# Patient Record
Sex: FEMALE | Race: BLACK OR AFRICAN AMERICAN
[De-identification: names, ages, dates, MRNs, and addresses within clinical notes are randomized per-mention and may not be internally consistent; named-entity substitution may affect disease eponyms.]

---

## 2017-02-19 ENCOUNTER — HOSPITAL ENCOUNTER (EMERGENCY)
Dept: HOSPITAL 62 - ER | Age: 57
Discharge: HOME | End: 2017-02-19
Payer: COMMERCIAL

## 2017-02-19 VITALS — DIASTOLIC BLOOD PRESSURE: 78 MMHG | SYSTOLIC BLOOD PRESSURE: 122 MMHG

## 2017-02-19 DIAGNOSIS — R11.2: Primary | ICD-10-CM

## 2017-02-19 DIAGNOSIS — R42: ICD-10-CM

## 2017-02-19 LAB
ALBUMIN SERPL-MCNC: 4.3 G/DL (ref 3.5–5)
ALP SERPL-CCNC: 92 U/L (ref 38–126)
ALT SERPL-CCNC: 72 U/L (ref 9–52)
ANION GAP SERPL CALC-SCNC: 11 MMOL/L (ref 5–19)
APPEARANCE UR: (no result)
AST SERPL-CCNC: 56 U/L (ref 14–36)
BASOPHILS # BLD AUTO: 0 10^3/UL (ref 0–0.2)
BASOPHILS NFR BLD AUTO: 0.4 % (ref 0–2)
BILIRUB DIRECT SERPL-MCNC: 0 MG/DL (ref 0–0.3)
BILIRUB SERPL-MCNC: 1 MG/DL (ref 0.2–1.3)
BILIRUB UR QL STRIP: NEGATIVE
BUN SERPL-MCNC: 16 MG/DL (ref 7–20)
CALCIUM: 9.7 MG/DL (ref 8.4–10.2)
CHLORIDE SERPL-SCNC: 102 MMOL/L (ref 98–107)
CO2 SERPL-SCNC: 31 MMOL/L (ref 22–30)
CREAT SERPL-MCNC: 0.82 MG/DL (ref 0.52–1.25)
EOSINOPHIL # BLD AUTO: 0 10^3/UL (ref 0–0.6)
EOSINOPHIL NFR BLD AUTO: 0.7 % (ref 0–6)
ERYTHROCYTE [DISTWIDTH] IN BLOOD BY AUTOMATED COUNT: 15.5 % (ref 11.5–14)
GLUCOSE SERPL-MCNC: 120 MG/DL (ref 75–110)
GLUCOSE UR STRIP-MCNC: NEGATIVE MG/DL
HCT VFR BLD CALC: 38 % (ref 36–47)
HGB BLD-MCNC: 12.8 G/DL (ref 12–15.5)
HGB HCT DIFFERENCE: 0.4
KETONES UR STRIP-MCNC: NEGATIVE MG/DL
LIPASE SERPL-CCNC: 106.5 U/L (ref 23–300)
LYMPHOCYTES # BLD AUTO: 2 10^3/UL (ref 0.5–4.7)
LYMPHOCYTES NFR BLD AUTO: 39.5 % (ref 13–45)
MCH RBC QN AUTO: 29.5 PG (ref 27–33.4)
MCHC RBC AUTO-ENTMCNC: 33.6 G/DL (ref 32–36)
MCV RBC AUTO: 88 FL (ref 80–97)
MONOCYTES # BLD AUTO: 0.6 10^3/UL (ref 0.1–1.4)
MONOCYTES NFR BLD AUTO: 11.8 % (ref 3–13)
NEUTROPHILS # BLD AUTO: 2.4 10^3/UL (ref 1.7–8.2)
NEUTS SEG NFR BLD AUTO: 47.6 % (ref 42–78)
NITRITE UR QL STRIP: NEGATIVE
PH UR STRIP: 6 [PH] (ref 5–9)
POTASSIUM SERPL-SCNC: 3.4 MMOL/L (ref 3.6–5)
PROT SERPL-MCNC: 7.6 G/DL (ref 6.3–8.2)
PROT UR STRIP-MCNC: NEGATIVE MG/DL
RBC # BLD AUTO: 4.34 10^6/UL (ref 3.72–5.28)
SODIUM SERPL-SCNC: 144 MMOL/L (ref 137–145)
SP GR UR STRIP: 1.01
UROBILINOGEN UR-MCNC: NEGATIVE MG/DL (ref ?–2)
WBC # BLD AUTO: 5 10^3/UL (ref 4–10.5)

## 2017-02-19 PROCEDURE — 96375 TX/PRO/DX INJ NEW DRUG ADDON: CPT

## 2017-02-19 PROCEDURE — 83690 ASSAY OF LIPASE: CPT

## 2017-02-19 PROCEDURE — 81001 URINALYSIS AUTO W/SCOPE: CPT

## 2017-02-19 PROCEDURE — 80053 COMPREHEN METABOLIC PANEL: CPT

## 2017-02-19 PROCEDURE — 96374 THER/PROPH/DIAG INJ IV PUSH: CPT

## 2017-02-19 PROCEDURE — 84703 CHORIONIC GONADOTROPIN ASSAY: CPT

## 2017-02-19 PROCEDURE — 96361 HYDRATE IV INFUSION ADD-ON: CPT

## 2017-02-19 PROCEDURE — 36415 COLL VENOUS BLD VENIPUNCTURE: CPT

## 2017-02-19 PROCEDURE — 85025 COMPLETE CBC W/AUTO DIFF WBC: CPT

## 2017-02-19 PROCEDURE — 82962 GLUCOSE BLOOD TEST: CPT

## 2017-02-19 PROCEDURE — 99284 EMERGENCY DEPT VISIT MOD MDM: CPT

## 2017-02-19 NOTE — ER DOCUMENT REPORT
ED General





- General


Chief Complaint: Vomiting


Stated Complaint: DIZZY/VOMITING


Mode of Arrival: Ambulatory


TRAVEL OUTSIDE OF THE [place] IN LAST 30 DAYS: No





- HPI


Patient complains to provider of: nausea vomiting dizziness


Notes: 


Patient coming in for symptoms ongoing for the last 4 days nausea vomiting 

dizziness.  Patient states no sick contacts no recent antibiotics.  Patient 

states no recent travel out of state or country states no flu vaccine this 

year.  Denies fevers chills chest pain abdominal pain





- Related Data


Allergies/Adverse Reactions: 


 





No Known Allergies Allergy (Verified 02/19/17 11:05)


 











Past Medical History





- General


Information source: Patient





- Social History


Smoking Status: Unknown if Ever Smoked


Chew tobacco use (# tins/day): No


Frequency of alcohol use: None


Drug Abuse: None


Family History: Reviewed & Not Pertinent


Patient has suicidal ideation: No


Patient has homicidal ideation: No





- Past Medical History


Cardiac Medical History: Reports: Hx Hypertension


   Denies: Hx Coronary Artery Disease, Hx Heart Attack


Pulmonary Medical History: Reports: Hx Asthma


   Denies: Hx Bronchitis, Hx COPD, Hx Pneumonia


Neurological Medical History: Denies: Hx Cerebrovascular Accident, Hx Seizures


Endocrine Medical History: Reports: Hx Diabetes Mellitus Type 2


Renal/ Medical History: Denies: Hx Peritoneal Dialysis


Musculoskeltal Medical History: Reports Hx Arthritis - shoulders, back, knee  


Past Surgical History: Reports: Hx Orthopedic Surgery - Knee.  Denies: Hx 

Appendectomy





- Immunizations


Hx Diphtheria, Pertussis, Tetanus Vaccination: No





Review of Systems





- Review of Systems


Constitutional: No symptoms reported


EENT: No symptoms reported


Cardiovascular: No symptoms reported


Respiratory: No symptoms reported


Gastrointestinal: Abdominal pain, Nausea, Vomiting


Genitourinary: No symptoms reported


Female Genitourinary: No symptoms reported


Musculoskeletal: No symptoms reported


Skin: No symptoms reported


Hematologic/Lymphatic: No symptoms reported


Neurological/Psychological: No symptoms reported


-: Yes All other systems reviewed and negative





Physical Exam





- Vital signs


Vitals: 


 











Temp Pulse Resp BP Pulse Ox


 


 97.9 F   109 H  16   153/97 H  96 


 


 02/19/17 11:06  02/19/17 11:06  02/19/17 11:06  02/19/17 11:06  02/19/17 11:06











Interpretation: Normal





- General


General appearance: Appears well, Alert





- HEENT


Head: Normocephalic, Atraumatic


Eyes: Normal


Pupils: PERRL





- Respiratory


Respiratory status: No respiratory distress


Chest status: Nontender


Breath sounds: Normal


Chest palpation: Normal





- Cardiovascular


Rhythm: Regular


Heart sounds: Normal auscultation


Murmur: No





- Abdominal


Inspection: Normal


Distension: No distension


Bowel sounds: Normal


Tenderness: Nontender


Organomegaly: No organomegaly





- Back


Back: Normal, Nontender





- Extremities


General upper extremity: Normal inspection, Nontender, Normal color, Normal ROM

, Normal temperature


General lower extremity: Normal inspection, Nontender, Normal color, Normal ROM

, Normal temperature, Normal weight bearing.  No: Samira's sign





- Neurological


Neuro grossly intact: Yes


Cognition: Normal


Orientation: AAOx4


[place] Coma Scale Eye Opening: Spontaneous


Manjinder Coma Scale Verbal: Oriented


Manjinder Coma Scale Motor: Obeys Commands


[place] Coma Scale Total: 15


Speech: Normal


Motor strength normal: LUE, RUE, LLE, RLE


Sensory: Normal





- Psychological


Associated symptoms: Normal affect, Normal mood





- Skin


Skin Temperature: Warm


Skin Moisture: Dry


Skin Color: Normal





Course





- Re-evaluation


Re-evalutation: 





02/19/17 15:17


The patient presents with n/v/d without signs of peritonitis or other life-

threatening or serious etiology. The patient appears stable for discharge and 

has been instructed to return immediately if the symptoms worsen in any way, or 

in 8-12hr if not improved for re-evaluation. The patient has been instructed to 

return if the symptoms worsen or change in any way.. 





- Vital Signs


Vital signs: 


 











Temp Pulse Resp BP Pulse Ox


 


 98.3 F   91   20   122/78   93 


 


 02/19/17 14:05  02/19/17 14:05  02/19/17 14:05  02/19/17 14:05  02/19/17 14:05














- Laboratory


Result Diagrams: 


 02/19/17 11:25





 02/19/17 11:25


Laboratory results interpreted by me: 


 











  02/19/17 02/19/17 02/19/17





  11:20 11:25 11:25


 


RDW   15.5 H 


 


Potassium    3.4 L


 


Carbon Dioxide    31 H


 


Glucose    120 H


 


AST    56 H


 


ALT    72 H


 


Urine Blood  SMALL H  














Discharge





- Discharge


Clinical Impression: 


 Feeling unwell





Nausea & vomiting


Qualifiers:


 Vomiting type: unspecified Vomiting Intractability: unspecified Qualified Code(

s): R11.2 - Nausea with vomiting, unspecified





Condition: Good


Disposition: HOME, SELF-CARE


Instructions:  Gastroenteritis (adult) ([facility])


Additional Instructions: 


Take medication as prescribed.  Return to ER symptoms worsen.


Prescriptions: 


Ondansetron [Zofran Odt 4 mg Tablet] 1 - 2 tab PO Q4H PRN #30 tab.rapdis


 PRN Reason: For Nausea/Vomiting


Promethazine HCl [Phenergan 25 mg Tablet] 1 - 2 tab PO Q6H PRN #30 tablet


 PRN Reason: 


Forms:  Return to Work

## 2017-02-19 NOTE — ER DOCUMENT REPORT
ED Medical Screen (RME)





- General


Stated Complaint: DIZZY/VOMITING


Mode of Arrival: Ambulatory


Information source: Patient


Notes: 


Patient complains of dizziness.  Patient reports symptoms are past 3 days.  

Patient reports nausea vomiting, and diarrhea.  Patient denies any abdominal 

pain.





hx: Hypertension, diabetes





I have greeted and performed a rapid initial assessment of this patient.  A 

comprehensive ED assessment and evaluation of the patient, analysis of test 

results and completion of the medical decision making process will be conducted 

by additional ED providers.


TRAVEL OUTSIDE OF THE [place] IN LAST 30 DAYS: No





- Related Data


Allergies/Adverse Reactions: 


 





No Known Allergies Allergy (Verified 02/19/17 11:05)


 











Past Medical History





- Past Medical History


Cardiac Medical History: Reports: Hx Hypertension


   Denies: Hx Coronary Artery Disease, Hx Heart Attack


Pulmonary Medical History: Reports: Hx Asthma


   Denies: Hx Bronchitis, Hx COPD, Hx Pneumonia


Neurological Medical History: Denies: Hx Cerebrovascular Accident, Hx Seizures


Endocrine Medical History: Reports: Hx Diabetes Mellitus Type 2


Musculoskeltal Medical History: Reports Hx Arthritis - shoulders, back, knee  


Past Surgical History: Reports: Hx Orthopedic Surgery





- Immunizations


Hx Diphtheria, Pertussis, Tetanus Vaccination: No





Physical Exam





- General


General appearance: Appears well, Alert


In distress: None

## 2018-08-10 ENCOUNTER — HOSPITAL ENCOUNTER (OUTPATIENT)
Dept: HOSPITAL 62 - OD | Age: 58
End: 2018-08-10
Attending: ORTHOPAEDIC SURGERY
Payer: COMMERCIAL

## 2018-08-10 DIAGNOSIS — I10: ICD-10-CM

## 2018-08-10 DIAGNOSIS — Z11.2: Primary | ICD-10-CM

## 2018-08-10 DIAGNOSIS — E11.8: ICD-10-CM

## 2018-08-10 LAB
ADD MANUAL DIFF: NO
ALBUMIN SERPL-MCNC: 4.2 G/DL (ref 3.5–5)
ALP SERPL-CCNC: 100 U/L (ref 38–126)
ALT SERPL-CCNC: 43 U/L (ref 9–52)
ANION GAP SERPL CALC-SCNC: 14 MMOL/L (ref 5–19)
AST SERPL-CCNC: 47 U/L (ref 14–36)
BASOPHILS # BLD AUTO: 0 10^3/UL (ref 0–0.2)
BASOPHILS NFR BLD AUTO: 0.4 % (ref 0–2)
BILIRUB DIRECT SERPL-MCNC: 0.3 MG/DL (ref 0–0.4)
BILIRUB SERPL-MCNC: 0.6 MG/DL (ref 0.2–1.3)
BUN SERPL-MCNC: 17 MG/DL (ref 7–20)
CALCIUM: 9.3 MG/DL (ref 8.4–10.2)
CHLORIDE SERPL-SCNC: 102 MMOL/L (ref 98–107)
CO2 SERPL-SCNC: 27 MMOL/L (ref 22–30)
EOSINOPHIL # BLD AUTO: 0.1 10^3/UL (ref 0–0.6)
EOSINOPHIL NFR BLD AUTO: 1.1 % (ref 0–6)
ERYTHROCYTE [DISTWIDTH] IN BLOOD BY AUTOMATED COUNT: 13.6 % (ref 11.5–14)
GLUCOSE SERPL-MCNC: 87 MG/DL (ref 75–110)
HCT VFR BLD CALC: 37 % (ref 36–47)
HGB BLD-MCNC: 12.6 G/DL (ref 12–15.5)
LYMPHOCYTES # BLD AUTO: 2.4 10^3/UL (ref 0.5–4.7)
LYMPHOCYTES NFR BLD AUTO: 48.9 % (ref 13–45)
MCH RBC QN AUTO: 30.4 PG (ref 27–33.4)
MCHC RBC AUTO-ENTMCNC: 34.2 G/DL (ref 32–36)
MCV RBC AUTO: 89 FL (ref 80–97)
MONOCYTES # BLD AUTO: 0.5 10^3/UL (ref 0.1–1.4)
MONOCYTES NFR BLD AUTO: 11.1 % (ref 3–13)
NEUTROPHILS # BLD AUTO: 1.9 10^3/UL (ref 1.7–8.2)
NEUTS SEG NFR BLD AUTO: 38.5 % (ref 42–78)
PLATELET # BLD: 296 10^3/UL (ref 150–450)
POTASSIUM SERPL-SCNC: 2.8 MMOL/L (ref 3.6–5)
PROT SERPL-MCNC: 7.7 G/DL (ref 6.3–8.2)
RBC # BLD AUTO: 4.16 10^6/UL (ref 3.72–5.28)
SODIUM SERPL-SCNC: 143.2 MMOL/L (ref 137–145)
TOTAL CELLS COUNTED % (AUTO): 100 %
WBC # BLD AUTO: 4.9 10^3/UL (ref 4–10.5)

## 2018-08-10 PROCEDURE — 36415 COLL VENOUS BLD VENIPUNCTURE: CPT

## 2018-08-10 PROCEDURE — 87070 CULTURE OTHR SPECIMN AEROBIC: CPT

## 2018-08-10 PROCEDURE — 80053 COMPREHEN METABOLIC PANEL: CPT

## 2018-08-10 PROCEDURE — 83036 HEMOGLOBIN GLYCOSYLATED A1C: CPT

## 2018-08-10 PROCEDURE — 85025 COMPLETE CBC W/AUTO DIFF WBC: CPT

## 2019-08-22 ENCOUNTER — HOSPITAL ENCOUNTER (EMERGENCY)
Dept: HOSPITAL 62 - ER | Age: 59
Discharge: HOME | End: 2019-08-22
Payer: COMMERCIAL

## 2019-08-22 VITALS — SYSTOLIC BLOOD PRESSURE: 134 MMHG | DIASTOLIC BLOOD PRESSURE: 83 MMHG

## 2019-08-22 DIAGNOSIS — R00.1: ICD-10-CM

## 2019-08-22 DIAGNOSIS — M79.602: ICD-10-CM

## 2019-08-22 DIAGNOSIS — Z87.891: ICD-10-CM

## 2019-08-22 DIAGNOSIS — E11.9: ICD-10-CM

## 2019-08-22 DIAGNOSIS — M54.12: ICD-10-CM

## 2019-08-22 DIAGNOSIS — I10: ICD-10-CM

## 2019-08-22 DIAGNOSIS — R20.2: ICD-10-CM

## 2019-08-22 DIAGNOSIS — H81.10: Primary | ICD-10-CM

## 2019-08-22 LAB
ADD MANUAL DIFF: NO
ALBUMIN SERPL-MCNC: 4 G/DL (ref 3.5–5)
ALP SERPL-CCNC: 97 U/L (ref 38–126)
ANION GAP SERPL CALC-SCNC: 7 MMOL/L (ref 5–19)
AST SERPL-CCNC: 31 U/L (ref 14–36)
BASOPHILS # BLD AUTO: 0 10^3/UL (ref 0–0.2)
BASOPHILS NFR BLD AUTO: 0.3 % (ref 0–2)
BILIRUB DIRECT SERPL-MCNC: 0.1 MG/DL (ref 0–0.4)
BILIRUB SERPL-MCNC: 0.4 MG/DL (ref 0.2–1.3)
BUN SERPL-MCNC: 22 MG/DL (ref 7–20)
CALCIUM: 9.2 MG/DL (ref 8.4–10.2)
CHLORIDE SERPL-SCNC: 105 MMOL/L (ref 98–107)
CK MB SERPL-MCNC: 1.6 NG/ML (ref ?–4.55)
CK SERPL-CCNC: 318 U/L (ref 30–135)
CO2 SERPL-SCNC: 29 MMOL/L (ref 22–30)
EOSINOPHIL # BLD AUTO: 0 10^3/UL (ref 0–0.6)
EOSINOPHIL NFR BLD AUTO: 0.9 % (ref 0–6)
ERYTHROCYTE [DISTWIDTH] IN BLOOD BY AUTOMATED COUNT: 14 % (ref 11.5–14)
GLUCOSE SERPL-MCNC: 91 MG/DL (ref 75–110)
HCT VFR BLD CALC: 37.3 % (ref 36–47)
HGB BLD-MCNC: 12.5 G/DL (ref 12–15.5)
LYMPHOCYTES # BLD AUTO: 2 10^3/UL (ref 0.5–4.7)
LYMPHOCYTES NFR BLD AUTO: 48.4 % (ref 13–45)
MCH RBC QN AUTO: 30 PG (ref 27–33.4)
MCHC RBC AUTO-ENTMCNC: 33.5 G/DL (ref 32–36)
MCV RBC AUTO: 90 FL (ref 80–97)
MONOCYTES # BLD AUTO: 0.4 10^3/UL (ref 0.1–1.4)
MONOCYTES NFR BLD AUTO: 10.1 % (ref 3–13)
NEUTROPHILS # BLD AUTO: 1.7 10^3/UL (ref 1.7–8.2)
NEUTS SEG NFR BLD AUTO: 40.3 % (ref 42–78)
PLATELET # BLD: 252 10^3/UL (ref 150–450)
POTASSIUM SERPL-SCNC: 3.9 MMOL/L (ref 3.6–5)
PROT SERPL-MCNC: 7.3 G/DL (ref 6.3–8.2)
RBC # BLD AUTO: 4.16 10^6/UL (ref 3.72–5.28)
TOTAL CELLS COUNTED % (AUTO): 100 %
TROPONIN I SERPL-MCNC: < 0.012 NG/ML
WBC # BLD AUTO: 4.2 10^3/UL (ref 4–10.5)

## 2019-08-22 PROCEDURE — 93010 ELECTROCARDIOGRAM REPORT: CPT

## 2019-08-22 PROCEDURE — 93005 ELECTROCARDIOGRAM TRACING: CPT

## 2019-08-22 PROCEDURE — 80053 COMPREHEN METABOLIC PANEL: CPT

## 2019-08-22 PROCEDURE — 84484 ASSAY OF TROPONIN QUANT: CPT

## 2019-08-22 PROCEDURE — 82553 CREATINE MB FRACTION: CPT

## 2019-08-22 PROCEDURE — 82550 ASSAY OF CK (CPK): CPT

## 2019-08-22 PROCEDURE — 85025 COMPLETE CBC W/AUTO DIFF WBC: CPT

## 2019-08-22 PROCEDURE — 36415 COLL VENOUS BLD VENIPUNCTURE: CPT

## 2019-08-22 NOTE — ER DOCUMENT REPORT
ED General





- General


Chief Complaint: Dizziness


Stated Complaint: DIZZINESS


Time Seen by Provider: 08/22/19 14:42


Primary Care Provider: 


COSMO JOHNSON MD [ASSOCIATE] - Follow up as needed


Mode of Arrival: Ambulatory


TRAVEL OUTSIDE OF THE [place] IN LAST 30 DAYS: No





- HPI


Notes: 





Patient presents emergency department for evaluation of dizziness as well as l

eft arm tingling and pain.  She states her dizziness started this morning.  She 

complains of vertiginous type sensation.  She has had this in the past.  She 

took 12-1/2 mg of meclizine without any significant improvement so she presents 

here.  She states that her vertigo has been improved since then.  She denies any

 visual changes.  No difficulty speaking or swallowing.  Moving her arms and 

legs without difficulty.  Over the last month she has had an aching, burning, 

and "jumping" pain in her left arm.  It seems to go through her entire arm, 

upper shoulder, and into her neck.  She states is helped by propping her arm on 

a pillow at night.  Nothing seems to make it worse.





- Related Data


Allergies/Adverse Reactions: 


                                        





No Known Allergies Allergy (Verified 08/22/19 14:14)


   











Past Medical History





- General


Information source: Patient





- Social History


Smoking Status: Former Smoker


Chew tobacco use (# tins/day): No


Frequency of alcohol use: Occasional


Drug Abuse: None


Family History: Reviewed & Not Pertinent, Malignancy


Patient has suicidal ideation: No


Patient has homicidal ideation: No





- Past Medical History


Cardiac Medical History: Reports: Hx Hypercholesterolemia - Dominant cancer, Hx 

Hypertension


   Denies: Hx Coronary Artery Disease, Hx Heart Attack


Pulmonary Medical History: Reports: Hx Asthma


   Denies: Hx Bronchitis, Hx COPD, Hx Pneumonia


Neurological Medical History: Denies: Hx Cerebrovascular Accident, Hx Seizures


Endocrine Medical History: Reports: Hx Diabetes Mellitus Type 2


Renal/ Medical History: Denies: Hx Peritoneal Dialysis


Musculoskeletal Medical History: Reports Hx Arthritis - shoulders, back, knee  


Past Surgical History: Reports: Hx Orthopedic Surgery - Knee.  Denies: Hx A

ppendectomy





- Immunizations


Hx Diphtheria, Pertussis, Tetanus Vaccination: No





Review of Systems





- Review of Systems


Constitutional: No symptoms reported


EENT: No symptoms reported


Cardiovascular: No symptoms reported


Respiratory: No symptoms reported


Gastrointestinal: No symptoms reported


Genitourinary: No symptoms reported


Musculoskeletal: See HPI


Skin: No symptoms reported


Neurological/Psychological: See HPI





Physical Exam





- Vital signs


Vitals: 





                                        











Temp Pulse Resp BP Pulse Ox


 


 98.2 F   58 L  15   128/78 H  94 


 


 08/22/19 14:19  08/22/19 14:19  08/22/19 14:19  08/22/19 14:19  08/22/19 14:19














- Notes


Notes: 





Vital signs reviewed, please refer to chart. Head is normocephalic, atraumatic. 

 Pupils equal round, reactive to light.  TMs are pearly gray with good light 

reflex.  Neck is supple without meningismus.  Heart is regular rate and rhythm. 

 Lungs are clear to auscultation bilaterally.  Abdomen is soft, nontender, 

normoactive bowel sounds throughout.  Extremities without cyanosis, clubbing. 

Posterior calves are nontender.  Peripheral pulses are equal.  Skin is warm and 

dry.  Patient is awake, alert, oriented x3.  Cranial nerves II - XII are grossly

 intact without focal neurological deficits.  Strength is plus 5 out of 5 

bilateral upper and lower extremities.  Sensation is intact.  Reflexes 

symmetrical.  Intact finger-nose-finger, rapid alternating movements, h

eel-to-shin.  Examination of the left upper extremity yields no obvious 

deformity.  She has significant tenderness to palpation over the paraspinal 

musculature throughout the cervical spine on the left, over the trapezius, 

particularly if he has insertion of the first rib.  She has biceps tenderness to

 palpation.  Biceps and brachioradialis reflexes are mildly diminished.  

Sensation is intact.  Strength is plus 5 out of 5 throughout the left upper 

extremity.





Course





- Re-evaluation


Re-evalutation: 





08/22/19 17:12


Patient presents emergency department for evaluation.  Her dizziness symptoms 

are most consistent with benign positional vertigo.  She has no tinnitus or 

hearing loss.  Her neurological exam is normal.  I explained to the patient that

 she could actually take 25 mg of meclizine as needed for this dizziness, and 

she voiced understanding.  In regards to her left upper extremity discomfort, I 

do believe this is secondary to cervical radiculopathy.  I will send her home 

with some muscle relaxers.  I explained to the patient in great detail that she 

has multiple risk factors for coronary artery disease.  She should discuss 

stress testing with her primary care provider.  She voiced understanding to 

this. Her work-up here was negative from a cardiac standpoint, but I was unable 

to rule out the presence of coronary artery disease.  She voiced understanding 

to this as well and was discharged.


08/22/19 17:14








- Vital Signs


Vital signs: 





                                        











Temp Pulse Resp BP Pulse Ox


 


 98.2 F   58 L  13   133/84 H  98 


 


 08/22/19 14:19  08/22/19 14:19  08/22/19 16:01  08/22/19 16:01  08/22/19 16:01














- Laboratory


Result Diagrams: 


                                 08/22/19 14:50





                                 08/22/19 14:50


Laboratory results interpreted by me: 





                                        











  08/22/19 08/22/19





  14:50 14:50


 


Lymph % (Auto)  48.4 H 


 


Seg Neutrophils %  40.3 L 


 


BUN   22 H


 


Est GFR (MDRD) Non-Af   56 L


 


Creatine Kinase   318 H














- EKG Interpretation by Me


Additional EKG results interpreted by me: 





08/22/19 17:14


Sinus bradycardia with a rate of 59 bpm.  Normal axis intervals.  Nonspecific ST

 changes, but no acute changes concerning for ischemia or infarction.  No 

significant change in compared to prior study of September 20, 2016





Discharge





- Discharge


Clinical Impression: 


 Cervical radiculopathy





Benign positional vertigo


Qualifiers:


 Laterality: unspecified laterality Qualified Code(s): H81.10 - Benign 

paroxysmal vertigo, unspecified ear





Condition: Stable


Disposition: HOME, SELF-CARE


Instructions:  Vertigo ([facility]), Dizziness ([facility]), Radiculopathy ([facility])


Additional Instructions: 


Take meclizine as needed for vertigo.  Please watch for drowsiness with this 

medication.  You can also take Robaxin as needed for neck and left arm pain.  

Follow-up with your primary care provider for further evaluation.  If you 

develop worsening or new concerning symptoms of any sort, return immediately to 

the emergency department for reevaluation.


Referrals: 


COSMO JOHNSON MD [ASSOCIATE] - Follow up as needed

## 2019-08-22 NOTE — ER DOCUMENT REPORT
ED Medical Screen (RME)





- General


Chief Complaint: Dizziness


Stated Complaint: DIZZINESS


Time Seen by Provider: 08/22/19 14:42


Primary Care Provider: 


COSMO JOHNSON MD [Primary Care Provider] - Follow up as needed


Mode of Arrival: Ambulatory


Information source: Patient


Notes: 





59-year-old female presented to ED for complaint of dizziness when she woke up 

this morning.  She states she has had a history of dizziness in the past and had

medications for but it did not help today.  She states she has had some tingling

and pulsating in her left arm down to her fingers for about the last month but 

she does not know what this is from.  She states when she is busy and up and 

doing stuff she can black it out but when she is not busy than she feels it.  

She does have a history of carpal tunnel surgery diabetes type 2 blood pressure 

and low potassium.  She is a former smoker she drinks about monthly and does not

use any street drugs.  She lives with her .  Patient is alert oriented 

respirations regular and unlabored speaking in full sentences walks with a even 

steady gait.














I have greeted and performed a rapid initial assessment of this patient.  A 

comprehensive ED assessment and evaluation of the patient, analysis of test 

results and completion of medical decision making process will be conducted by 

an additional ED providers.














Dictation of this chart was performed using voice recognition software; 

therefore, there may be some unintended grammatical errors.


TRAVEL OUTSIDE OF THE [place] IN LAST 30 DAYS: No





- Related Data


Allergies/Adverse Reactions: 


                                        





No Known Allergies Allergy (Verified 08/22/19 14:14)


   











Past Medical History





- Past Medical History


Cardiac Medical History: Reports: Hx Hypertension


   Denies: Hx Coronary Artery Disease, Hx Heart Attack


Pulmonary Medical History: Reports: Hx Asthma


   Denies: Hx Bronchitis, Hx COPD, Hx Pneumonia


Neurological Medical History: Denies: Hx Cerebrovascular Accident, Hx Seizures


Endocrine Medical History: Reports: Hx Diabetes Mellitus Type 2


Renal/ Medical History: Denies: Hx Peritoneal Dialysis


Musculoskeltal Medical History: Reports Hx Arthritis - shoulders, back, knee  


Past Surgical History: Reports: Hx Orthopedic Surgery - Knee.  Denies: Hx 

Appendectomy





- Immunizations


Hx Diphtheria, Pertussis, Tetanus Vaccination: No





Physical Exam





- Vital signs


Vitals: 





                                        











Temp Pulse Resp BP Pulse Ox


 


 98.2 F   58 L  15   128/78 H  94 


 


 08/22/19 14:19  08/22/19 14:19  08/22/19 14:19  08/22/19 14:19  08/22/19 14:19














Course





- Vital Signs


Vital signs: 





                                        











Temp Pulse Resp BP Pulse Ox


 


 98.2 F   58 L  15   128/78 H  94 


 


 08/22/19 14:19  08/22/19 14:19  08/22/19 14:19  08/22/19 14:19  08/22/19 14:19














Doctor's Discharge





- Discharge


Referrals: 


COSMO JOHNSON MD [Primary Care Provider] - Follow up as needed

## 2020-12-24 ENCOUNTER — HOSPITAL ENCOUNTER (EMERGENCY)
Dept: HOSPITAL 62 - ER | Age: 60
Discharge: HOME | End: 2020-12-24
Payer: COMMERCIAL

## 2020-12-24 VITALS — DIASTOLIC BLOOD PRESSURE: 62 MMHG | SYSTOLIC BLOOD PRESSURE: 118 MMHG

## 2020-12-24 DIAGNOSIS — Z79.899: ICD-10-CM

## 2020-12-24 DIAGNOSIS — R51.9: ICD-10-CM

## 2020-12-24 DIAGNOSIS — R50.9: ICD-10-CM

## 2020-12-24 DIAGNOSIS — R53.1: ICD-10-CM

## 2020-12-24 DIAGNOSIS — J45.909: ICD-10-CM

## 2020-12-24 DIAGNOSIS — N30.00: Primary | ICD-10-CM

## 2020-12-24 DIAGNOSIS — E11.9: ICD-10-CM

## 2020-12-24 DIAGNOSIS — M54.5: ICD-10-CM

## 2020-12-24 DIAGNOSIS — M54.9: ICD-10-CM

## 2020-12-24 DIAGNOSIS — I10: ICD-10-CM

## 2020-12-24 DIAGNOSIS — R10.9: ICD-10-CM

## 2020-12-24 LAB
ADD MANUAL DIFF: NO
ALBUMIN SERPL-MCNC: 3.3 G/DL (ref 3.5–5)
ALP SERPL-CCNC: 237 U/L (ref 38–126)
ANION GAP SERPL CALC-SCNC: 12 MMOL/L (ref 5–19)
APPEARANCE UR: (no result)
APTT PPP: (no result) S
AST SERPL-CCNC: 57 U/L (ref 14–36)
BASOPHILS # BLD AUTO: 0.1 10^3/UL (ref 0–0.2)
BASOPHILS NFR BLD AUTO: 0.5 % (ref 0–2)
BILIRUB DIRECT SERPL-MCNC: 2.4 MG/DL (ref 0–0.4)
BILIRUB SERPL-MCNC: 3.3 MG/DL (ref 0.2–1.3)
BILIRUB UR QL STRIP: NEGATIVE
BUN SERPL-MCNC: 30 MG/DL (ref 7–20)
CALCIUM: 8.4 MG/DL (ref 8.4–10.2)
CHLORIDE SERPL-SCNC: 95 MMOL/L (ref 98–107)
CK SERPL-CCNC: 56 U/L (ref 30–135)
CO2 SERPL-SCNC: 28 MMOL/L (ref 22–30)
EOSINOPHIL # BLD AUTO: 0 10^3/UL (ref 0–0.6)
EOSINOPHIL NFR BLD AUTO: 0.2 % (ref 0–6)
ERYTHROCYTE [DISTWIDTH] IN BLOOD BY AUTOMATED COUNT: 13.8 % (ref 11.5–14)
GLUCOSE SERPL-MCNC: 106 MG/DL (ref 75–110)
GLUCOSE UR STRIP-MCNC: NEGATIVE MG/DL
HCT VFR BLD CALC: 33.1 % (ref 36–47)
HGB BLD-MCNC: 11.5 G/DL (ref 12–15.5)
KETONES UR STRIP-MCNC: NEGATIVE MG/DL
LYMPHOCYTES # BLD AUTO: 1.1 10^3/UL (ref 0.5–4.7)
LYMPHOCYTES NFR BLD AUTO: 9.3 % (ref 13–45)
MCH RBC QN AUTO: 29.6 PG (ref 27–33.4)
MCHC RBC AUTO-ENTMCNC: 34.7 G/DL (ref 32–36)
MCV RBC AUTO: 85 FL (ref 80–97)
MONOCYTES # BLD AUTO: 1.2 10^3/UL (ref 0.1–1.4)
MONOCYTES NFR BLD AUTO: 10.5 % (ref 3–13)
NEUTROPHILS # BLD AUTO: 9.3 10^3/UL (ref 1.7–8.2)
NEUTS SEG NFR BLD AUTO: 79.5 % (ref 42–78)
NITRITE UR QL STRIP: POSITIVE
PH UR STRIP: 6 [PH] (ref 5–9)
PLATELET # BLD: 269 10^3/UL (ref 150–450)
POTASSIUM SERPL-SCNC: 3.4 MMOL/L (ref 3.6–5)
PROT SERPL-MCNC: 7.3 G/DL (ref 6.3–8.2)
PROT UR STRIP-MCNC: 100 MG/DL
RBC # BLD AUTO: 3.88 10^6/UL (ref 3.72–5.28)
SP GR UR STRIP: 1.01
TOTAL CELLS COUNTED % (AUTO): 100 %
UROBILINOGEN UR-MCNC: 4 MG/DL (ref ?–2)
WBC # BLD AUTO: 11.7 10^3/UL (ref 4–10.5)

## 2020-12-24 PROCEDURE — 76770 US EXAM ABDO BACK WALL COMP: CPT

## 2020-12-24 PROCEDURE — 36415 COLL VENOUS BLD VENIPUNCTURE: CPT

## 2020-12-24 PROCEDURE — 82550 ASSAY OF CK (CPK): CPT

## 2020-12-24 PROCEDURE — 87086 URINE CULTURE/COLONY COUNT: CPT

## 2020-12-24 PROCEDURE — 80053 COMPREHEN METABOLIC PANEL: CPT

## 2020-12-24 PROCEDURE — 85025 COMPLETE CBC W/AUTO DIFF WBC: CPT

## 2020-12-24 PROCEDURE — 87088 URINE BACTERIA CULTURE: CPT

## 2020-12-24 PROCEDURE — 87186 SC STD MICRODIL/AGAR DIL: CPT

## 2020-12-24 PROCEDURE — 96372 THER/PROPH/DIAG INJ SC/IM: CPT

## 2020-12-24 PROCEDURE — 99285 EMERGENCY DEPT VISIT HI MDM: CPT

## 2020-12-24 PROCEDURE — 81001 URINALYSIS AUTO W/SCOPE: CPT

## 2020-12-24 NOTE — ER DOCUMENT REPORT
ED Neck/Back Problem





- General


Chief Complaint: Back Pain


Stated Complaint: BACK PAIN


Time Seen by Provider: 12/24/20 14:08


Mode of Arrival: Wheelchair


Information source: Patient


Notes: 





ED Medical Screen ([facility] Back)





- General


Chief Complaint: Back Pain


Stated Complaint: BACK PAIN


Time Seen by Provider: 12/24/20 14:08


Mode of Arrival: Wheelchair


Information source: Patient


Notes: 





60-year-old female presented to ED for flank pain since ago Monday.  She did 

call the doctor by the telephone on Friday and was started on fluconazole.  She 

took all of this and did not get any relief.  She states that the doctor 

recommended she come in to be tested for Covid she did get tested for Covid and 

flu and they were negative yesterday.  She states she still having the symptoms 

and she is here today she states she has had kidney problems in the past but it 

has been a while.  We will get blood urine and kidney ultrasounds and have her 

examined by another provider.  She is also having a headache and would like to 

be examined for this as well.


MY NOTES


60-year-old black female arrives with her  with chief complaint of 4 days

of bilateral SI joint pain.  She points to these areas.  She was seen by Urm[facility] 

at triage and urinalysis reveals UTI pyelonephritis.  Ultrasound revealed a 

echogenic kidneys with kidney stones but nonobstructing.  Patient also complains

of chronic frontal sinusitis pressure and pain without any rhinorrhea or sore 

throat.  Patient denies any nuchal rigidity or otalgia.


TRAVEL OUTSIDE OF THE [place] IN LAST 30 DAYS: No





- HPI


Patient complains to provider of: Pain, Lower back.  No: Injury, Neck, Upper bac

k


Onset: Last week


Where: Home.  No: Indoors, Nursing Home


Onset: Gradual


Timing: Constant, Still present, Worse


Quality of pain: Achy


Severity: Moderate


Pain Level: 3





- Related Data


Allergies/Adverse Reactions: 


                                        





No Known Allergies Allergy (Verified 08/22/19 14:14)


   











Past Medical History





- General


Information source: Patient





- Social History


Smoking Status: Never Smoker


Cigarette use (# per day): No


Chew tobacco use (# tins/day): No


Smoking Education Provided: No


Frequency of alcohol use: None


Drug Abuse: None


Lives with: Family


Family History: Reviewed & Not Pertinent, Malignancy


Patient has suicidal ideation: No


Patient has homicidal ideation: No





- Past Medical History


Cardiac Medical History: Reports: Hx Hypercholesterolemia - Dominant cancer, Hx 

Hypertension


   Denies: Hx Coronary Artery Disease, Hx Heart Attack


Pulmonary Medical History: Reports: Hx Asthma


   Denies: Hx Bronchitis, Hx COPD, Hx Pneumonia


Neurological Medical History: Denies: Hx Cerebrovascular Accident, Hx Seizures


Endocrine Medical History: Reports: Hx Diabetes Mellitus Type 2


Renal/ Medical History: Denies: Hx Peritoneal Dialysis


Musculoskeletal Medical History: Reports Hx Arthritis - shoulders, back, knee  


Past Surgical History: Reports: Hx Orthopedic Surgery - Knee.  Denies: Hx 

Appendectomy





- Immunizations


Hx Diphtheria, Pertussis, Tetanus Vaccination: No





Review of Systems





- Review of Systems


Constitutional: See HPI, Fever, Weakness


EENT: No symptoms reported


Cardiovascular: No symptoms reported


Respiratory: No symptoms reported


Gastrointestinal: No symptoms reported


Genitourinary: No symptoms reported


Female Genitourinary: No symptoms reported


Musculoskeletal: See HPI, Back pain, Joint swelling, Muscle pain, Muscle 

stiffness


Skin: No symptoms reported


Hematologic/Lymphatic: No symptoms reported


Neurological/Psychological: No symptoms reported


-: Yes All other systems reviewed and negative





Physical Exam





- Vital signs


Vitals: 


                                        











Temp Pulse Resp BP Pulse Ox


 


 100.3 F   90   16   112/56 L  100 


 


 12/24/20 13:31  12/24/20 13:31  12/24/20 13:31  12/24/20 13:31  12/24/20 13:31











Interpretation: Febrile





- General


General appearance: Appears well, Alert





- HEENT


Head: Normocephalic, Atraumatic


Eyes: Normal


Pupils: PERRL


Sinus: Frontal, Tenderness





- Respiratory


Respiratory status: No respiratory distress


Chest status: Nontender


Breath sounds: Normal


Chest palpation: Normal





- Cardiovascular


Rhythm: Regular


Heart sounds: Normal auscultation


Murmur: No





- Abdominal


Inspection: Normal


Distension: No distension


Bowel sounds: Normal


Tenderness: Nontender


Organomegaly: No organomegaly





- Rectal


Hemorrhoids: Other - deferred





- Genitourinary


Bimanuel exam: Other - deferred





- Back


Back: Tender - L>R SI and CVA pains on rom p/p





- Extremities


General upper extremity: Normal inspection, Nontender, Normal color, Normal ROM,

Normal temperature


General lower extremity: Normal inspection, Nontender, Normal color, Normal ROM,

Normal temperature, Normal weight bearing.  No: Samira's sign





- Neurological


Neuro grossly intact: Yes


Cognition: Normal


Orientation: AAOx4


[place] Coma Scale Eye Opening: Spontaneous


Manjinder Coma Scale Verbal: Oriented


[place] Coma Scale Motor: Obeys Commands


[place] Coma Scale Total: 15


Speech: Normal


Motor strength normal: LUE, RUE, LLE, RLE


Sensory: Normal





- Psychological


Associated symptoms: Normal affect, Normal mood





- Skin


Skin Temperature: Warm


Skin Moisture: Dry


Skin Color: Normal





Course





- Vital Signs


Vital signs: 


                                        











Temp Pulse Resp BP Pulse Ox


 


 100.3 F   90   16   112/56 L  100 


 


 12/24/20 13:31  12/24/20 13:31  12/24/20 13:31  12/24/20 13:31  12/24/20 13:31














- Laboratory Results


Result Diagrams: 


                                 12/24/20 14:42





                                 12/24/20 14:42


Laboratory Results Interpreted: 


                                        











  12/24/20 12/24/20 12/24/20





  14:42 14:42 14:42


 


WBC  11.7 H  


 


Hgb  11.5 L  


 


Hct  33.1 L  


 


Lymph % (Auto)  9.3 L  


 


Absolute Neuts (auto)  9.3 H  


 


Seg Neutrophils %  79.5 H  


 


Sodium   135.4 L 


 


Potassium   3.4 L 


 


Chloride   95 L 


 


BUN   30 H 


 


Creatinine   2.09 H 


 


Est GFR ( Amer)   29 L 


 


Est GFR (MDRD) Non-Af   24 L 


 


Total Bilirubin   3.3 H 


 


Direct Bilirubin   2.4 H 


 


AST   57 H 


 


ALT   41 H 


 


Alkaline Phosphatase   237 H 


 


Albumin   3.3 L 


 


Urine Protein    100 H


 


Urine Blood    MODERATE H


 


Urine Nitrite    POSITIVE H


 


Urine Urobilinogen    4.0 H


 


Ur Leukocyte Esterase    LARGE H











Critical Laboratory Results Reviewed: Yes


Attending or Supervising Physician who Reviewed Labs: AMNA YOO JR





- Radiology Results


Radiology Results Interpreted: 





12/24/20 17:22


Dr. Fairchild radiologist read this ultrasound.


Critical Radiology Results Reviewed: No Critical Results


Attending or Supervising Physician who Reviewed Radiology: AMNA YOO JR





Critical Care Note





- Critical Care Note


Comments: 





Patient was given IM shot of Toradol Rocephin and gentamicin by nursing staff





Discharge





- Discharge


Clinical Impression: 


 Frontal headache





UTI (urinary tract infection)


Qualifiers:


 Urinary tract infection type: acute cystitis Hematuria presence: without 

hematuria Qualified Code(s): N30.00 - Acute cystitis without hematuria





Back pain


Qualifiers:


 Back pain location: low back pain Chronicity: acute Back pain laterality: 

bilateral Sciatica presence: without sciatica Qualified Code(s): M54.5 - Low 

back pain





Condition: Stable


Disposition: HOME, SELF-CARE


Additional Instructions: 


Apply Bactroban to nose nightly for 1 week; take medications orally as directed;

encourage fluids but avoid carbonated drinks like soda for least 1 week.  Return

to ER as needed and follow-up with your personal doctor next week.  Avoid 

bending lifting or twisting because of back pain


Prescriptions: 


Mupirocin [Bactroban 2% Ointment 22 gm] 1 applic NASL HSP PRN #1 tube


 PRN Reason: 


Ciprofloxacin HCl [Cipro 500 mg Tablet] 500 mg PO BID #20 tablet

## 2020-12-24 NOTE — ER DOCUMENT REPORT
ED Medical Screen (RME)





- General


Chief Complaint: Back Pain


Stated Complaint: BACK PAIN


Time Seen by Provider: 12/24/20 14:08


Mode of Arrival: Wheelchair


Information source: Patient


Notes: 





60-year-old female presented to ED for flank pain since ago Monday.  She did 

call the doctor by the telephone on Friday and was started on fluconazole.  She 

took all of this and did not get any relief.  She states that the doctor 

recommended she come in to be tested for Covid she did get tested for Covid and 

flu and they were negative yesterday.  She states she still having the symptoms 

and she is here today she states she has had kidney problems in the past but it 

has been a while.  We will get blood urine and kidney ultrasounds and have her 

examined by another provider.  She is also having a headache and would like to 

be examined for this as well.

















I have greeted and performed a rapid initial assessment of this patient.  A 

comprehensive ED assessment and evaluation of the patient, analysis of test 

results and completion of medical decision making process will be conducted by 

an additional ED providers.


TRAVEL OUTSIDE OF THE [place] IN LAST 30 DAYS: No





- Related Data


Allergies/Adverse Reactions: 


                                        





No Known Allergies Allergy (Verified 08/22/19 14:14)


   











Past Medical History





- Past Medical History


Cardiac Medical History: Reports: Hx Hypercholesterolemia - Dominant cancer, Hx 

Hypertension


   Denies: Hx Coronary Artery Disease, Hx Heart Attack


Pulmonary Medical History: Reports: Hx Asthma


   Denies: Hx Bronchitis, Hx COPD, Hx Pneumonia


Neurological Medical History: Denies: Hx Cerebrovascular Accident, Hx Seizures


Endocrine Medical History: Reports: Hx Diabetes Mellitus Type 2


Renal/ Medical History: Denies: Hx Peritoneal Dialysis


Musculoskeltal Medical History: Reports Hx Arthritis - shoulders, back, knee  


Past Surgical History: Reports: Hx Orthopedic Surgery - Knee.  Denies: Hx 

Appendectomy





- Immunizations


Hx Diphtheria, Pertussis, Tetanus Vaccination: No





Physical Exam





- Vital signs


Vitals: 





                                        











Temp Pulse Resp BP Pulse Ox


 


 100.3 F   90   16   112/56 L  100 


 


 12/24/20 13:31  12/24/20 13:31  12/24/20 13:31  12/24/20 13:31  12/24/20 13:31














Course





- Vital Signs


Vital signs: 





                                        











Temp Pulse Resp BP Pulse Ox


 


 100.3 F   90   16   112/56 L  100 


 


 12/24/20 13:31  12/24/20 13:31  12/24/20 13:31  12/24/20 13:31  12/24/20 13:31

## 2020-12-24 NOTE — RADIOLOGY REPORT (SQ)
EXAM DESCRIPTION:  U/S RETROPERITON (RENAL/AORTA)



IMAGES COMPLETED DATE/TIME:  12/24/2020 2:49 pm



REASON FOR STUDY:  Bilateral kidney pain



COMPARISON:  None.



TECHNIQUE:  Dynamic and static grayscale images acquired of the kidneys and bladder and recorded on P
ACS. Additional selected color Doppler and spectral images recorded.



LIMITATIONS:  None.



FINDINGS:  RIGHT KIDNEY:  The right kidney measures 12.5 x 6.2 x 5.9 cm, normal size.  Diffuse mild i
ncreased echogenicity of the kidney may be on the basis of medical renal disease.  Multiple very smal
l echogenic areas may represent small nonobstructing punctate calculi.

LEFT KIDNEY:  The left kidney measures 11.4 x 7.1 x 7.3 cm, normal size.  Diffuse mild increased echo
genicity of the kidney may be on the basis of medical renal disease.  Multiple very small punctate ec
hogenic areas may represent nonobstructing calcifications.

BLADDER: No masses.  Bilateral ureteral jets are visualized.

OTHER FINDINGS:  Incidentally, fatty liver.  No other significant finding.



IMPRESSION:  1.  Diffuse mild increased echogenicity of the kidneys, may be on the basis of medical r
enal disease.

2.  Multiple very small echogenic foci, may represent small nonobstructing punctate calculi.



TECHNICAL DOCUMENTATION:  JOB ID:  [id number]

 2011 [employer]- All Rights Reserved



Reading location - IP/workstation name: [email or web address]